# Patient Record
Sex: FEMALE | Race: WHITE
[De-identification: names, ages, dates, MRNs, and addresses within clinical notes are randomized per-mention and may not be internally consistent; named-entity substitution may affect disease eponyms.]

---

## 2017-07-26 ENCOUNTER — HOSPITAL ENCOUNTER (EMERGENCY)
Dept: HOSPITAL 10 - FTE | Age: 22
Discharge: HOME | End: 2017-07-26
Payer: MEDICAID

## 2017-07-26 VITALS
BODY MASS INDEX: 27.85 KG/M2 | WEIGHT: 177.47 LBS | BODY MASS INDEX: 27.85 KG/M2 | HEIGHT: 67 IN | WEIGHT: 177.47 LBS | HEIGHT: 67 IN

## 2017-07-26 DIAGNOSIS — G51.0: Primary | ICD-10-CM

## 2017-07-26 PROCEDURE — 99284 EMERGENCY DEPT VISIT MOD MDM: CPT

## 2017-07-26 NOTE — ERD
ER Documentation


Chief Complaint


Date/Time


DATE: 7/26/17 


TIME: 14:07


Chief Complaint


left side facial droop x yesterday





HPI


Patient is a 22-year-old female presents to the emergency department for 

concerns of left-sided facial droop which started yesterday.  Patient states 

initially she felt as if she "burned her tongue".  Patient states she then 

noticed that she was having difficulty closing her left eye.  Patient does 

report eye dryness.  Patient states she feels as if her left-sided lips are 

numb similar to one one would receive a lidocaine injection at the dentist.  

Patient denies any URI symptoms.  She denies any cough, rhinorrhea, fever, 

chills.  States that over the last week she has recently become stressed due to 

financial changes.  Patient denies any chest pain or shortness of breath, 

headache, blurry vision, nausea, vomiting or LOC.  Patient is able to ambulate 

without any difficulty.  Patient has no unilateral weakness.  Patient denies 

any slurred speech or arm droop.  No recent travel or outer activities. No sick 

contacts.





ROS


All systems reviewed and are negative except as per history of present illness.





Medications


Home Meds


Active Scripts


Carboxymethylcellulose Sodium* (Refresh Tears*) 15 Ml Drops, 2 DROP LEFT EYE QID

, #1 EA


   Prov:ANDRIY VARGAS PA-C         7/26/17


Valacyclovir HCl (Valtrex) 1,000 Mg Tablet, 1000 MG PO TID for 7 Days, TAB


   Prov:ANDRIY VARGAS PA-C         7/26/17


Prednisone* (Prednisone*) 20 Mg Tab, 60 MG PO DAILY for 7 Days, TAB


   Prov:ANDRIY VARGAS PA-C         7/26/17





Allergies


Allergies:  


Coded Allergies:  


     No Known Allergy (Unverified , 7/26/17)





PMhx/Soc


Hx Alcohol Use:  No


Hx Substance Use:  No


Hx Tobacco Use:  No





FmHx


Family History:  No diabetes





Physical Exam


Vitals





Vital Signs








  Date Time  Temp Pulse Resp B/P Pulse Ox O2 Delivery O2 Flow Rate FiO2


 


7/26/17 12:42 98.3 73 16 140/76 100   








Physical Exam


GENERAL: Well-developed, well-nourished female. Appears in no acute distress.  

Speaking in full sentences.


HEAD: Normocephalic, atraumatic. 


EYES: Pupils are equally reactive bilaterally. EOMs grossly intact. No 

conjunctival erythema. 


ENT: Moist mucous membranes. No uvula deviation. No kissing tonsils. 


NECK: Supple. No meningismus. Normal range of motion of the neck.


LUNG: Clear to auscultation bilaterally. No rhonchi, wheezing, rales or coarse 

breath sounds. 


HEART: Regular rate and rhythm. No murmurs, rubs or gallops.


BACK: No midline tenderness. 


EXTREMITIES: Equal pulses bilaterally. No peripheral clubbing, cyanosis or 

edema. No unilateral leg swelling.


NEUROLOGIC: Alert and oriented x3, cooperative. Mood and affect appropriate to 

situation. No forehead wrinkles noted to L forehead when asked to raise 

eyebrows. +Forehead affected.  Left mouth angle droop affected when asked to 

smile.  Incomplete closure of left eye noted.  Normal speech. Motor exam: 5/5 

strength in upper and lower extremities. Sensory exam: Sensation intact to 

light touch on all four extremities. Cerebellar function exam: No dysmetria on 

finger-to-nose test. Steady gait. No pronator drift.





Procedures/MDM


MEDICAL DECISION MAKING:


Patient is a 22 year old female who presents with left sided facial numbness x 

1 day. Patient did report recent increase in stress at home due to financial 

troubles. Neuro exam revealed no forehead wrinkles noted to L forehead when 

asked to raise eyebrows. +Forehead affected.  Left mouth angle droop affected 

when asked to smile.  Incomplete closure of left eye noted. Findings were 

consistent with Bell's palsy. No indication for blood work or imaging studies 

at this time. Patient was advised to take medication as prescribed, use eye 

patch and lubricating eye drops. Low suspicion for CVA, intracranial mass, 

midline shift, Lyme disease, MS, spinal cord injury, botulism, [place] 

syndrome or drug overdose.





PRESCRIPTIONS:


Valacyclovir, Prednisone, Refresh eye drops





DISCHARGE:


At this time, patient is stable for discharge and outpatient management. I have 

instructed the patient to follow-up with his/her primary care physician in 1-2 

days. I have discussed with the patient the possibility of needing to see a 

specialist for further workup and imaging studies if symptoms persist. I have 

instructed the patient to promptly return to the ER for any new or worsening 

symptoms including increased pain, fever, nausea, vomiting, weakness or LOC. 

The patient and/or family expressed understanding of and agreement with this 

plan. All questions were answered. Home care instructions were provided. 








Patients blood pressure was elevated (>120/80) but appears stable without 

evidence of hypertensive emergency, hypertensive urgency or end-organ failure. 

I had discussion with the patient about the risks of hypertension. I have 

advised the patient to follow up with his/her primary care physician for 

outpatient monitoring and treatment for hypertension in 2-3 days. I have 

instructed the patient to return to the ER for any new or worsening symptoms 

including chest pain, shortness of breath, headache, blurred vision, confusion, 

nausea, vomiting or LOC.





Departure


Diagnosis:  


 Primary Impression:  


 Bell's palsy


Patient Instructions:  Bell's Palsy


Referrals:  


[facility]


YOU HAVE RECEIVED A MEDICAL SCREENING EXAM AND THE RESULTS INDICATE THAT YOU DO 

NOT HAVE A CONDITION THAT REQUIRES URGENT TREATMENT IN THE EMERGENCY DEPARTMENT.





FURTHER EVALUATION AND TREATMENT OF YOUR CONDITION CAN WAIT UNTIL YOU ARE SEEN 

IN YOUR DOCTORS OFFICE WITHIN THE NEXT 1-2 DAYS. IT IS YOUR RESPONSIBILITY TO 

MAKE AN APPOINTMENT FOR FOLOW-UP CARE.





IF YOU HAVE A PRIMARY DOCTOR


--you should call your primary doctor and schedule an appointment





IF YOU DO NOT HAVE A PRIMARY DOCTOR YOU CAN CALL OUR PHYSICIAN REFERRAL HOTLINE 

AT


 [phone] 





IF YOU CAN NOT AFFORD TO SEE A PHYSICIAN YOU CAN CHOSE FROM THE FOLLOWING 

[facility] [phone] [address] [facility]. [facility] [phone] [address] [facility]. [facility] [phone]


[address] [facility] [phone] [address] [facility] [phone] [address] [facility]. [facility]. [phone]


[address] [facility]. [facility]


YOU HAVE RECEIVED A MEDICAL SCREENING EXAM AND THE RESULTS INDICATE THAT YOU DO 

NOT HAVE A CONDITION THAT REQUIRES URGENT TREATMENT IN THE EMERGENCY DEPARTMENT.





FURTHER EVALUATION AND TREATMENT OF YOUR CONDITION CAN WAIT UNTIL YOU ARE SEEN 

IN YOUR DOCTORS OFFICE WITHIN THE NEXT 1-2 DAYS. IT IS YOUR RESPONSIBILITY TO 

MAKE AN APPOINTMENT FOR FOLOW-UP CARE.





IF YOU HAVE A PRIMARY DOCTOR


--you should call your primary doctor and schedule and appointment





IF YOU DO NOT HAVE A PRIMARY DOCTOR YOU CAN CALL OUR PHYSICIAN REFERRAL HOTLINE 

AT [phone].





IF YOU CAN NOT AFFORD TO SEE A PHYSICIAN YOU CAN CHOSE FROM THE FOLLOWING 

[facility] INSTITUTIONS:





[facility]


[address] [place] [address]





[facility]


[address] [place] [address]





[facility]


[address] [place] [address]





Additional Instructions:  


Use an eye patch at nighttime.  Avoid itching/scratching eyes. Use lubricating 

drops. 





Call your primary care doctor TOMORROW for an appointment during the next 1-2 

days.See the doctor sooner or return here if your condition worsens before your 

appointment time.











ANDRIY VARGAS PA-C Jul 26, 2017 14:11

## 2018-01-06 ENCOUNTER — HOSPITAL ENCOUNTER (EMERGENCY)
Age: 23
Discharge: HOME | End: 2018-01-06

## 2018-01-06 ENCOUNTER — HOSPITAL ENCOUNTER (EMERGENCY)
Dept: HOSPITAL 91 - FTE | Age: 23
Discharge: HOME | End: 2018-01-06
Payer: COMMERCIAL

## 2018-01-06 DIAGNOSIS — J06.9: Primary | ICD-10-CM

## 2018-01-06 PROCEDURE — 99284 EMERGENCY DEPT VISIT MOD MDM: CPT

## 2018-01-06 RX ADMIN — ACETAMINOPHEN 1 MG: 325 TABLET, FILM COATED ORAL at 23:01

## 2018-02-15 ENCOUNTER — HOSPITAL ENCOUNTER (OUTPATIENT)
Age: 23
Discharge: HOME | End: 2018-02-15

## 2018-02-15 ENCOUNTER — HOSPITAL ENCOUNTER (OUTPATIENT)
Dept: HOSPITAL 91 - OBT | Age: 23
Discharge: HOME | End: 2018-02-15
Payer: COMMERCIAL

## 2018-02-15 DIAGNOSIS — O26.892: Primary | ICD-10-CM

## 2018-02-15 DIAGNOSIS — Z3A.20: ICD-10-CM

## 2018-02-15 DIAGNOSIS — R10.2: ICD-10-CM

## 2018-02-15 LAB
ADD UMIC: YES
UR ASCORBIC ACID: 40 MG/DL
UR BACTERIA: (no result) /HPF
UR BILIRUBIN (DIP): NEGATIVE MG/DL
UR BLOOD (DIP): NEGATIVE MG/DL
UR CLARITY: (no result)
UR COLOR: YELLOW
UR GLUCOSE (DIP): NEGATIVE MG/DL
UR KETONES (DIP): NEGATIVE MG/DL
UR LEUKOCYTE ESTERASE (DIP): (no result) LEU/UL
UR MUCUS: (no result) /HPF
UR NITRITE (DIP): NEGATIVE MG/DL
UR PH (DIP): 6 (ref 5–9)
UR RBC: 2 /HPF (ref 0–5)
UR SPECIFIC GRAVITY (DIP): 1.02 (ref 1–1.03)
UR SQUAMOUS EPITHELIAL CELL: (no result) /HPF
UR TOTAL PROTEIN (DIP): NEGATIVE MG/DL
UR UROBILINOGEN (DIP): NEGATIVE MG/DL
UR WBC: 5 /HPF (ref 0–5)

## 2018-02-15 PROCEDURE — 81001 URINALYSIS AUTO W/SCOPE: CPT

## 2018-02-15 PROCEDURE — 76817 TRANSVAGINAL US OBSTETRIC: CPT
